# Patient Record
Sex: MALE | Race: WHITE | NOT HISPANIC OR LATINO | Employment: OTHER | URBAN - NONMETROPOLITAN AREA
[De-identification: names, ages, dates, MRNs, and addresses within clinical notes are randomized per-mention and may not be internally consistent; named-entity substitution may affect disease eponyms.]

---

## 2021-01-01 ENCOUNTER — OFFICE VISIT (OUTPATIENT)
Dept: CARDIOLOGY | Facility: CLINIC | Age: 66
End: 2021-01-01

## 2021-01-01 VITALS
DIASTOLIC BLOOD PRESSURE: 48 MMHG | SYSTOLIC BLOOD PRESSURE: 94 MMHG | WEIGHT: 211.6 LBS | BODY MASS INDEX: 29.62 KG/M2 | HEIGHT: 71 IN | OXYGEN SATURATION: 88 % | HEART RATE: 94 BPM

## 2021-01-01 VITALS
OXYGEN SATURATION: 79 % | DIASTOLIC BLOOD PRESSURE: 53 MMHG | WEIGHT: 208 LBS | SYSTOLIC BLOOD PRESSURE: 106 MMHG | BODY MASS INDEX: 29.12 KG/M2 | HEART RATE: 90 BPM | HEIGHT: 71 IN

## 2021-01-01 DIAGNOSIS — C91.10 CLL (CHRONIC LYMPHOCYTIC LEUKEMIA) (HCC): ICD-10-CM

## 2021-01-01 DIAGNOSIS — F17.211 CIGARETTE NICOTINE DEPENDENCE IN REMISSION: ICD-10-CM

## 2021-01-01 DIAGNOSIS — I50.43 HEART FAILURE DUE TO VALVULAR DISEASE, ACUTE ON CHRONIC, COMBINED (HCC): ICD-10-CM

## 2021-01-01 DIAGNOSIS — F10.10 ALCOHOL ABUSE: ICD-10-CM

## 2021-01-01 DIAGNOSIS — I10 ESSENTIAL HYPERTENSION: ICD-10-CM

## 2021-01-01 DIAGNOSIS — E78.5 HYPERLIPIDEMIA LDL GOAL <70: ICD-10-CM

## 2021-01-01 DIAGNOSIS — I35.0 NONRHEUMATIC AORTIC (VALVE) STENOSIS: Primary | ICD-10-CM

## 2021-01-01 DIAGNOSIS — R06.02 SHORTNESS OF BREATH: ICD-10-CM

## 2021-01-01 DIAGNOSIS — I38 HEART FAILURE DUE TO VALVULAR DISEASE, ACUTE ON CHRONIC, COMBINED (HCC): ICD-10-CM

## 2021-01-01 DIAGNOSIS — J43.9 PULMONARY EMPHYSEMA, UNSPECIFIED EMPHYSEMA TYPE (HCC): ICD-10-CM

## 2021-01-01 DIAGNOSIS — N18.9 CHRONIC RENAL IMPAIRMENT, UNSPECIFIED CKD STAGE: ICD-10-CM

## 2021-01-01 PROCEDURE — 99214 OFFICE O/P EST MOD 30 MIN: CPT | Performed by: SPECIALIST

## 2021-01-01 PROCEDURE — 99204 OFFICE O/P NEW MOD 45 MIN: CPT | Performed by: SPECIALIST

## 2021-01-01 PROCEDURE — 93000 ELECTROCARDIOGRAM COMPLETE: CPT | Performed by: SPECIALIST

## 2021-01-01 RX ORDER — TRAMADOL HYDROCHLORIDE 50 MG/1
100 TABLET ORAL NIGHTLY
COMMUNITY

## 2021-01-01 RX ORDER — FUROSEMIDE 40 MG/1
40 TABLET ORAL DAILY
Qty: 90 TABLET | Refills: 1 | Status: SHIPPED | OUTPATIENT
Start: 2021-01-01

## 2021-01-01 RX ORDER — ALBUTEROL SULFATE 90 UG/1
200 AEROSOL, METERED RESPIRATORY (INHALATION) DAILY PRN
COMMUNITY
Start: 2021-01-01

## 2021-01-01 RX ORDER — POTASSIUM CHLORIDE 20 MEQ/1
20 TABLET, EXTENDED RELEASE ORAL DAILY
Qty: 90 TABLET | Refills: 1 | Status: SHIPPED | OUTPATIENT
Start: 2021-01-01

## 2021-01-01 RX ORDER — FUROSEMIDE 40 MG/1
40 TABLET ORAL DAILY
COMMUNITY
Start: 2021-01-01 | End: 2021-01-01 | Stop reason: SDUPTHER

## 2021-01-01 RX ORDER — FENOFIBRATE 200 MG/1
200 CAPSULE ORAL
Qty: 90 CAPSULE | Refills: 1 | Status: SHIPPED | OUTPATIENT
Start: 2021-01-01

## 2021-01-01 RX ORDER — ATORVASTATIN CALCIUM 40 MG/1
40 TABLET, FILM COATED ORAL NIGHTLY
Qty: 90 TABLET | Refills: 1 | Status: SHIPPED | OUTPATIENT
Start: 2021-01-01

## 2021-01-01 RX ORDER — HYDROCHLOROTHIAZIDE 25 MG/1
25 TABLET ORAL DAILY
Qty: 90 TABLET | Refills: 1 | Status: SHIPPED | OUTPATIENT
Start: 2021-01-01

## 2021-01-01 RX ORDER — FENOFIBRATE 200 MG/1
200 CAPSULE ORAL
COMMUNITY
End: 2021-01-01 | Stop reason: SDUPTHER

## 2021-01-01 RX ORDER — MONTELUKAST SODIUM 10 MG/1
TABLET ORAL NIGHTLY
COMMUNITY
Start: 2021-01-01

## 2021-01-01 RX ORDER — HYDROCHLOROTHIAZIDE 25 MG/1
25 TABLET ORAL DAILY
COMMUNITY
Start: 2021-01-01 | End: 2021-01-01 | Stop reason: SDUPTHER

## 2021-01-01 RX ORDER — ATORVASTATIN CALCIUM 40 MG/1
40 TABLET, FILM COATED ORAL NIGHTLY
COMMUNITY
Start: 2021-01-01 | End: 2021-01-01 | Stop reason: SDUPTHER

## 2021-01-01 RX ORDER — POTASSIUM CHLORIDE 20 MEQ/1
TABLET, EXTENDED RELEASE ORAL DAILY
COMMUNITY
Start: 2021-01-01 | End: 2021-01-01 | Stop reason: SDUPTHER

## 2021-01-01 RX ORDER — ALBUTEROL SULFATE 2.5 MG/3ML
2.5 SOLUTION RESPIRATORY (INHALATION) 4 TIMES DAILY
COMMUNITY
Start: 2021-01-01

## 2021-12-15 PROBLEM — J43.9 COPD WITH EMPHYSEMA (HCC): Status: ACTIVE | Noted: 2021-01-01

## 2021-12-15 PROBLEM — F10.10 ALCOHOL ABUSE: Status: ACTIVE | Noted: 2021-01-01

## 2021-12-15 PROBLEM — I38 HEART FAILURE DUE TO VALVULAR DISEASE, ACUTE ON CHRONIC, COMBINED (HCC): Status: ACTIVE | Noted: 2021-01-01

## 2021-12-15 PROBLEM — I35.0 NONRHEUMATIC AORTIC (VALVE) STENOSIS: Status: ACTIVE | Noted: 2021-01-01

## 2021-12-15 PROBLEM — E78.5 HYPERLIPIDEMIA LDL GOAL <70: Status: ACTIVE | Noted: 2021-01-01

## 2021-12-15 PROBLEM — I50.43 HEART FAILURE DUE TO VALVULAR DISEASE, ACUTE ON CHRONIC, COMBINED (HCC): Status: ACTIVE | Noted: 2021-01-01

## 2021-12-15 PROBLEM — I10 ESSENTIAL HYPERTENSION: Status: ACTIVE | Noted: 2021-01-01

## 2021-12-15 PROBLEM — F17.211 CIGARETTE NICOTINE DEPENDENCE IN REMISSION: Status: ACTIVE | Noted: 2021-01-01

## 2021-12-15 PROBLEM — R06.02 SHORTNESS OF BREATH: Status: ACTIVE | Noted: 2021-01-01

## 2021-12-15 NOTE — PROGRESS NOTES
Subjective   Initial consultation, post hospital discharge with CHF  Prince Malloy is a 66 y.o. male who presents to day for hypoxia, Edema, Heart Murmur, and Hypertension.    CHIEF COMPLIANT  Chief Complaint   Patient presents with   • hypoxia   • Edema   • Heart Murmur   • Hypertension       Active Problems:  Problem List Items Addressed This Visit        Cardiac and Vasculature    Heart failure due to valvular disease, acute on chronic, combined (HCC)    Relevant Medications    metoprolol tartrate (LOPRESSOR) 25 MG tablet    Nonrheumatic aortic (valve) stenosis - Primary    Relevant Medications    metoprolol tartrate (LOPRESSOR) 25 MG tablet    Essential hypertension    Relevant Medications    furosemide (LASIX) 40 MG tablet    hydroCHLOROthiazide (HYDRODIURIL) 25 MG tablet    metoprolol tartrate (LOPRESSOR) 25 MG tablet    Hyperlipidemia LDL goal <70    Relevant Medications    atorvastatin (LIPITOR) 40 MG tablet    fenofibrate micronized (LOFIBRA) 200 MG capsule       Mental Health    Alcohol abuse       Pulmonary and Pneumonias    Shortness of breath    COPD with emphysema (HCC)    Relevant Medications    albuterol (PROVENTIL) (2.5 MG/3ML) 0.083% nebulizer solution    albuterol sulfate  (90 Base) MCG/ACT inhaler    ipratropium (ATROVENT) 0.02 % nebulizer solution       Tobacco    Cigarette nicotine dependence in remission          HPI  HPI  Was at [facility] were he had a heart cath, and was told he needed another procedure, no PCI was done, was told that he needs valve surgery, he has class IIIb dyspnea, with bilateral LE edema, with pain in both legs walking short distance, denies chest pain, no palpitations, no syncope, he has hypertension, diabetes, hyperlipidemia, COPD, home O2 dependent, he quitted smoking one month ago, he smoked 1 1/2 ppd for 38 years, he drinks 6 pack a day, family history, father with CABG  PRIOR MEDS  Current Outpatient Medications on File Prior to Visit    Medication Sig Dispense Refill   • fenofibrate micronized (LOFIBRA) 200 MG capsule Take 200 mg by mouth Every Morning Before Breakfast.     • metoprolol tartrate (LOPRESSOR) 25 MG tablet Take 25 mg by mouth 2 (Two) Times a Day.     • albuterol (PROVENTIL) (2.5 MG/3ML) 0.083% nebulizer solution Take 2.5 mg by nebulization 4 (Four) Times a Day.     • albuterol sulfate  (90 Base) MCG/ACT inhaler Inhale 200 puffs Daily As Needed.     • atorvastatin (LIPITOR) 40 MG tablet Take 40 mg by mouth Every Night.     • furosemide (LASIX) 40 MG tablet Take 40 mg by mouth Daily.     • hydroCHLOROthiazide (HYDRODIURIL) 25 MG tablet Take 25 mg by mouth Daily.     • ipratropium (ATROVENT) 0.02 % nebulizer solution Take 0.02 mL by nebulization Daily As Needed.       No current facility-administered medications on file prior to visit.       ALLERGIES  Patient has no known allergies.    HISTORY  Past Medical History:   Diagnosis Date   • CHF (congestive heart failure) ([facility])    • COPD (chronic obstructive pulmonary disease) ([facility])    • CVA (cerebral vascular accident) ([facility])    • Hyperlipidemia    • Hypertension        Social History     Socioeconomic History   • Marital status:    Tobacco Use   • Smoking status: Former Smoker     Packs/day: 1.50     Years: 35.00     Pack years: 52.50   • Smokeless tobacco: Never Used   Substance and Sexual Activity   • Alcohol use: Not Currently   • Drug use: Never       Family History   Problem Relation Age of Onset   • Hypertension Mother    • Stroke Mother    • Bone cancer Father        Review of Systems   Constitutional: Positive for fatigue. Negative for chills and fever.   HENT: Negative for congestion, rhinorrhea and sore throat.    Eyes: Negative for visual disturbance.   Respiratory: Positive for chest tightness (last 2 -3 days), shortness of breath and wheezing.    Cardiovascular: Positive for leg swelling (feet legs ankles). Negative for chest pain.   Gastrointestinal: Negative  "for constipation and diarrhea.   Endocrine: Positive for cold intolerance.   Genitourinary: Negative.    Musculoskeletal: Positive for arthralgias.   Skin: Negative.    Allergic/Immunologic: Positive for environmental allergies.   Neurological: Positive for dizziness and light-headedness.   Hematological: Bruises/bleeds easily.   Psychiatric/Behavioral: Negative for sleep disturbance.       Objective     VITALS: /53 (BP Location: Left arm, Patient Position: Sitting)   Pulse 90   Ht 180.3 cm (71\")   Wt 94.3 kg (208 lb)   SpO2 (!) 79% Comment: put on O2 @ at 4 liters came up to 95 pox  BMI 29.01 kg/m²     LABS:   Lab Results (most recent)     None          IMAGING:   No Images in the past 120 days found..    EXAM:  Physical Exam  Constitutional:       Appearance: He is well-developed.   HENT:      Head: Normocephalic and atraumatic.   Eyes:      Pupils: Pupils are equal, round, and reactive to light.   Neck:      Thyroid: No thyromegaly.      Vascular: No JVD.   Cardiovascular:      Rate and Rhythm: Normal rate and regular rhythm.      Chest Wall: PMI is not displaced.      Pulses: Normal pulses.      Heart sounds: Normal heart sounds, S1 normal and S2 normal. No murmur heard.  No friction rub. No gallop.       Comments: Bilateral leg edema  3/6 ejection systolic murmur, heard best at aortic area, radiates across sternal border  Pulmonary:      Effort: Pulmonary effort is normal. No respiratory distress.      Breath sounds: Normal breath sounds. No stridor. No wheezing or rales.   Chest:      Chest wall: No tenderness.   Abdominal:      General: Bowel sounds are normal. There is no distension.      Palpations: Abdomen is soft. There is no mass.      Tenderness: There is no abdominal tenderness. There is no guarding or rebound.   Musculoskeletal:      Cervical back: Neck supple. No edema.   Skin:     General: Skin is warm and dry.      Coloration: Skin is not pale.      Findings: No erythema or rash. "   Neurological:      Mental Status: He is alert and oriented to person, place, and time.      Cranial Nerves: No cranial nerve deficit.      Coordination: Coordination normal.   Psychiatric:         Behavior: Behavior normal.         Procedure   Procedures       Assessment/Plan     Diagnoses and all orders for this visit:    1. Nonrheumatic aortic (valve) stenosis (Primary)    2. Hyperlipidemia LDL goal <70    3. Heart failure due to valvular disease, acute on chronic, combined (HCC)    4. Essential hypertension    5. Alcohol abuse    6. Shortness of breath    7. Pulmonary emphysema, unspecified emphysema type (HCC)    8. Cigarette nicotine dependence in remission    1. Will need to get old records including cath report, he is in CHF now, will refer to ER to be admitted  2. Advised to quit drinking  3. He quitted smoking last month  4. Will likely be considered for TAVR once CHF is under control    Return one month.      Advance Care Planning   ACP discussion was declined by the patient. Patient does not have an advance directive, declines further assistance.          MEDS ORDERED DURING VISIT:  No orders of the defined types were placed in this encounter.      As always, Alee Wong APRN  I appreciate very much the opportunity to participate in the cardiovascular care of your patients. Please do not hesitate to call me with any questions with regards to Prince Malloy evaluation and management.         This document has been electronically signed by Ninfa Barrera MD  December 15, 2021 12:05 EST

## 2021-12-20 PROBLEM — C91.10 CLL (CHRONIC LYMPHOCYTIC LEUKEMIA) (HCC): Status: ACTIVE | Noted: 2021-01-01

## 2021-12-20 PROBLEM — N18.9 CHRONIC RENAL IMPAIRMENT: Status: ACTIVE | Noted: 2021-01-01

## 2021-12-20 NOTE — PROGRESS NOTES
Subjective   Follow up, severe aortic stenosis  Prince Santa is a 66 y.o. male who presents to day for Follow-up (Here for ER f/u), Hyperlipidemia, Hypertension, and Congestive Heart Failure.    CHIEF COMPLIANT  Chief Complaint   Patient presents with   • Follow-up     Here for ER f/u   • Hyperlipidemia   • Hypertension   • Congestive Heart Failure     Problem List:    1. CHF, combined  2. Non-rheumatic AV stenosis  3. HTN  4. Hyperlipidemia  5. Alcohol abuse  6. COPD / Shortness of breath  7. CVA        Problem List Items Addressed This Visit        Cardiac and Vasculature    Heart failure due to valvular disease, acute on chronic, combined (HCC)    Relevant Medications    metoprolol tartrate (LOPRESSOR) 25 MG tablet    Nonrheumatic aortic (valve) stenosis - Primary    Relevant Medications    furosemide (LASIX) 40 MG tablet    hydroCHLOROthiazide (HYDRODIURIL) 25 MG tablet    metoprolol tartrate (LOPRESSOR) 25 MG tablet    potassium chloride (K-DUR,KLOR-CON) 20 MEQ CR tablet    Essential hypertension    Relevant Medications    furosemide (LASIX) 40 MG tablet    hydroCHLOROthiazide (HYDRODIURIL) 25 MG tablet    metoprolol tartrate (LOPRESSOR) 25 MG tablet    Hyperlipidemia LDL goal <70    Relevant Medications    atorvastatin (LIPITOR) 40 MG tablet    fenofibrate micronized (LOFIBRA) 200 MG capsule    Other Relevant Orders    Basic Metabolic Panel       Genitourinary and Reproductive     Chronic renal impairment    Relevant Medications    furosemide (LASIX) 40 MG tablet    hydroCHLOROthiazide (HYDRODIURIL) 25 MG tablet    Other Relevant Orders    Ambulatory Referral to Nephrology       Hematology and Neoplasia    CLL (chronic lymphocytic leukemia) (HCC)    Relevant Orders    Ambulatory Referral to Hematology / Oncology       Mental Health    Alcohol abuse       Pulmonary and Pneumonias    Shortness of breath       Tobacco    Cigarette nicotine dependence in remission          HPI  Patient was seen at the ER after  seeing me and apparently was sent home he said that he feels a little bit better with less edema less shortness of breath he is using oxygen at home there is no chest pain no palpitations he said he quit smoking since he been to the [place] 4 weeks ago                PRIOR MEDS  Current Outpatient Medications on File Prior to Visit   Medication Sig Dispense Refill   • albuterol (PROVENTIL) (2.5 MG/3ML) 0.083% nebulizer solution Take 2.5 mg by nebulization 4 (Four) Times a Day.     • albuterol sulfate  (90 Base) MCG/ACT inhaler Inhale 200 puffs Daily As Needed.     • ipratropium (ATROVENT) 0.02 % nebulizer solution Take 0.02 mL by nebulization Daily As Needed.     • montelukast (SINGULAIR) 10 MG tablet Every Night.     • traMADol (ULTRAM) 50 MG tablet Take 100 mg by mouth Every Night.     • [DISCONTINUED] atorvastatin (LIPITOR) 40 MG tablet Take 40 mg by mouth Every Night.     • [DISCONTINUED] fenofibrate micronized (LOFIBRA) 200 MG capsule Take 200 mg by mouth Every Morning Before Breakfast.     • [DISCONTINUED] furosemide (LASIX) 40 MG tablet Take 40 mg by mouth Daily.     • [DISCONTINUED] hydroCHLOROthiazide (HYDRODIURIL) 25 MG tablet Take 25 mg by mouth Daily.     • [DISCONTINUED] metoprolol tartrate (LOPRESSOR) 25 MG tablet Take 25 mg by mouth 2 (Two) Times a Day.     • [DISCONTINUED] potassium chloride (K-DUR,KLOR-CON) 20 MEQ CR tablet Daily.       No current facility-administered medications on file prior to visit.       ALLERGIES  Patient has no known allergies.    HISTORY  Past Medical History:   Diagnosis Date   • CHF (congestive heart failure) ([facility])    • COPD (chronic obstructive pulmonary disease) ([facility])    • CVA (cerebral vascular accident) ([facility])    • Hyperlipidemia    • Hypertension        Social History     Socioeconomic History   • Marital status:    Tobacco Use   • Smoking status: Former Smoker     Packs/day: 1.50     Years: 35.00     Pack years: 52.50   • Smokeless tobacco: Never Used  "  Substance and Sexual Activity   • Alcohol use: Not Currently   • Drug use: Never       Family History   Problem Relation Age of Onset   • Hypertension Mother    • Stroke Mother    • Bone cancer Father        Review of Systems   Constitutional: Positive for fatigue.   HENT: Negative.    Eyes: Negative.    Respiratory: Positive for shortness of breath (02 prn).    Cardiovascular: Positive for leg swelling. Negative for chest pain and palpitations.   Gastrointestinal: Negative.    Endocrine: Negative.    Genitourinary: Negative.    Musculoskeletal: Positive for arthralgias and myalgias.   Skin: Negative.    Allergic/Immunologic: Negative.    Neurological: Positive for weakness (leg). Negative for dizziness, syncope and light-headedness.   Hematological: Bruises/bleeds easily.   Psychiatric/Behavioral: Positive for sleep disturbance.       Objective     VITALS: BP 94/48 (BP Location: Left arm, Patient Position: Sitting)   Pulse 94   Ht 180.3 cm (70.98\")   Wt 96 kg (211 lb 9.6 oz)   SpO2 (!) 88%   BMI 29.53 kg/m²     LABS:   Lab Results (most recent)     None          IMAGING:   No Images in the past 120 days found..    EXAM:  Physical Exam  Constitutional:       Appearance: He is well-developed.   HENT:      Head: Normocephalic and atraumatic.   Eyes:      Pupils: Pupils are equal, round, and reactive to light.   Neck:      Thyroid: No thyromegaly.      Vascular: No JVD.   Cardiovascular:      Rate and Rhythm: Normal rate and regular rhythm.      Chest Wall: PMI is not displaced.      Pulses: Normal pulses.      Heart sounds: Normal heart sounds, S1 normal and S2 normal. No murmur heard.  No friction rub. No gallop.       Comments: 3/6 ejection systolic murmur heard at the aortic area reason for substernal border to enter carotid with a single second heart sound  Bilateral lower extremity edema with stasis pigmentation  Pulmonary:      Effort: Pulmonary effort is normal. No respiratory distress.      Breath " sounds: Normal breath sounds. No stridor. No wheezing or rales.   Chest:      Chest wall: No tenderness.   Abdominal:      General: Bowel sounds are normal. There is no distension.      Palpations: Abdomen is soft. There is no mass.      Tenderness: There is no abdominal tenderness. There is no guarding or rebound.   Musculoskeletal:      Cervical back: Neck supple. No edema.   Skin:     General: Skin is warm and dry.      Coloration: Skin is not pale.      Findings: No erythema or rash.   Neurological:      Mental Status: He is alert and oriented to person, place, and time.      Cranial Nerves: No cranial nerve deficit.      Coordination: Coordination normal.   Psychiatric:         Behavior: Behavior normal.         Procedure     ECG 12 Lead    Date/Time: 12/20/2021 4:23 PM  Performed by: Ninfa Barrera MD  Authorized by: Ninfa Barrera MD           EKG: Normal sinus rhythm with right bundle branch block Krajicek hypertrophy otherwise unremarkable EKG compared with the EKG on December 15, 2021 no significant change           Assessment/Plan     Diagnoses and all orders for this visit:    1. Nonrheumatic aortic (valve) stenosis (Primary)  -     furosemide (LASIX) 40 MG tablet; Take 1 tablet by mouth Daily.  Dispense: 90 tablet; Refill: 1  -     hydroCHLOROthiazide (HYDRODIURIL) 25 MG tablet; Take 1 tablet by mouth Daily.  Dispense: 90 tablet; Refill: 1  -     metoprolol tartrate (LOPRESSOR) 25 MG tablet; Take 1 tablet by mouth 2 (Two) Times a Day.  Dispense: 90 tablet; Refill: 1  -     potassium chloride (K-DUR,KLOR-CON) 20 MEQ CR tablet; Take 1 tablet by mouth Daily.  Dispense: 90 tablet; Refill: 1    2. Hyperlipidemia LDL goal <70  -     atorvastatin (LIPITOR) 40 MG tablet; Take 1 tablet by mouth Every Night.  Dispense: 90 tablet; Refill: 1  -     fenofibrate micronized (LOFIBRA) 200 MG capsule; Take 1 capsule by mouth Every Morning Before Breakfast.  Dispense: 90 capsule; Refill: 1  -     Basic Metabolic Panel;  Future    3. Heart failure due to valvular disease, acute on chronic, combined (HCC)    4. Essential hypertension    5. Alcohol abuse    6. Shortness of breath    7. CLL (chronic lymphocytic leukemia) (HCC)  -     Ambulatory Referral to Hematology / Oncology    8. Chronic renal impairment, unspecified CKD stage  -     Ambulatory Referral to Nephrology    9. Cigarette nicotine dependence in remission    Other orders  -     ECG 12 Lead    1. Patient was seen in the ER and apparently he was not taking his diuretics as well as he has chronic lymphocytic leukemia being followed by Dr. Perez his blood count is 35,000 with a hemoglobin of 7 we will refer him back to Dr. Neal Moreau prior to any intervention for the aortic valve issue with his hematology situation needs to be assessed and fixed  2. I reviewed the cath report from November 19 done at [facility] unfortunately did not the right heart cath and for this reason the aortic valve area is unknown but he had a peak to peak gradient of 56 mmHg with a mean gradient of 51 mmHg his coronary artery has only mild coronary artery stenosis with no significant obstructive disease with left ventricle-systolic pressure of 10 no mention of the left ventricular systolic function, I will try to get a COBY report which was apparently done at the time it is not available to me  3. His edema is better will continue with diuresing him  4. Blood pressure is relatively low but currently is asymptomatic he is also little bit hypoxic but again he is not using his oxygen he left in the car  5. He still not smoking  6. We will refer him to Dr. Truong for renal dysfunction  7.  The case was discussed with Dr. Mayer will be considered only if he is in a sufficient survival.  For the CLL so would refer again as mentioned above to Dr. Perez  8.  He is edema is much better with diuresis we will continue with the current management I will get CBC CMP to assess his  creatinine    Return in about 4 weeks (around 1/17/2022).      ACP discussion was declined by the patient. Patient does not have an advance directive, declines further assistance.          MEDS ORDERED DURING VISIT:  New Medications Ordered This Visit   Medications   • atorvastatin (LIPITOR) 40 MG tablet     Sig: Take 1 tablet by mouth Every Night.     Dispense:  90 tablet     Refill:  1   • furosemide (LASIX) 40 MG tablet     Sig: Take 1 tablet by mouth Daily.     Dispense:  90 tablet     Refill:  1   • hydroCHLOROthiazide (HYDRODIURIL) 25 MG tablet     Sig: Take 1 tablet by mouth Daily.     Dispense:  90 tablet     Refill:  1   • fenofibrate micronized (LOFIBRA) 200 MG capsule     Sig: Take 1 capsule by mouth Every Morning Before Breakfast.     Dispense:  90 capsule     Refill:  1   • metoprolol tartrate (LOPRESSOR) 25 MG tablet     Sig: Take 1 tablet by mouth 2 (Two) Times a Day.     Dispense:  90 tablet     Refill:  1   • potassium chloride (K-DUR,KLOR-CON) 20 MEQ CR tablet     Sig: Take 1 tablet by mouth Daily.     Dispense:  90 tablet     Refill:  1       As always, Alee Wong APRN  I appreciate very much the opportunity to participate in the cardiovascular care of your patients. Please do not hesitate to call me with any questions with regards to Prince Santa evaluation and management.         This document has been electronically signed by Ninfa Barrera MD  December 20, 2021 17:07 EST